# Patient Record
Sex: MALE
[De-identification: names, ages, dates, MRNs, and addresses within clinical notes are randomized per-mention and may not be internally consistent; named-entity substitution may affect disease eponyms.]

---

## 2021-06-09 ENCOUNTER — TRANSCRIPTION ENCOUNTER (OUTPATIENT)
Age: 41
End: 2021-06-09

## 2021-06-21 ENCOUNTER — APPOINTMENT (OUTPATIENT)
Dept: OPHTHALMOLOGY | Facility: CLINIC | Age: 41
End: 2021-06-21
Payer: COMMERCIAL

## 2021-06-21 ENCOUNTER — NON-APPOINTMENT (OUTPATIENT)
Age: 41
End: 2021-06-21

## 2021-06-21 PROCEDURE — 92004 COMPRE OPH EXAM NEW PT 1/>: CPT

## 2021-07-19 ENCOUNTER — NON-APPOINTMENT (OUTPATIENT)
Age: 41
End: 2021-07-19

## 2021-07-19 ENCOUNTER — APPOINTMENT (OUTPATIENT)
Dept: OPHTHALMOLOGY | Facility: CLINIC | Age: 41
End: 2021-07-19
Payer: COMMERCIAL

## 2021-07-19 PROCEDURE — 99213 OFFICE O/P EST LOW 20 MIN: CPT

## 2024-10-02 ENCOUNTER — NON-APPOINTMENT (OUTPATIENT)
Age: 44
End: 2024-10-02

## 2024-10-02 ENCOUNTER — APPOINTMENT (OUTPATIENT)
Dept: PLASTIC SURGERY | Facility: CLINIC | Age: 44
End: 2024-10-02
Payer: COMMERCIAL

## 2024-10-02 VITALS
HEART RATE: 58 BPM | BODY MASS INDEX: 24.08 KG/M2 | OXYGEN SATURATION: 97 % | SYSTOLIC BLOOD PRESSURE: 118 MMHG | HEIGHT: 71 IN | TEMPERATURE: 98.3 F | DIASTOLIC BLOOD PRESSURE: 76 MMHG | WEIGHT: 172 LBS

## 2024-10-02 DIAGNOSIS — Z78.9 OTHER SPECIFIED HEALTH STATUS: ICD-10-CM

## 2024-10-02 DIAGNOSIS — D17.1 BENIGN LIPOMATOUS NEOPLASM OF SKIN AND SUBCUTANEOUS TISSUE OF TRUNK: ICD-10-CM

## 2024-10-02 PROBLEM — Z00.00 ENCOUNTER FOR PREVENTIVE HEALTH EXAMINATION: Status: ACTIVE | Noted: 2024-10-02

## 2024-10-02 PROCEDURE — 99202 OFFICE O/P NEW SF 15 MIN: CPT

## 2024-10-03 PROBLEM — Z78.9 SOCIAL ALCOHOL USE: Status: ACTIVE | Noted: 2024-10-02

## 2024-10-03 PROBLEM — Z78.9 NON-SMOKER: Status: ACTIVE | Noted: 2024-10-02

## 2024-10-03 PROBLEM — D17.1 LIPOMA OF BACK: Status: ACTIVE | Noted: 2024-10-03

## 2024-10-03 NOTE — PHYSICAL EXAM
[NI] : Normal [de-identified] : NL respiratory effort noted  [de-identified] : Right upper back mass near the posterior axillary fold, approx 3 cm in diameter. It is soft and mobile in the SQ. It does seem tethered to the dermis, but there is no visible punctum.

## 2024-10-03 NOTE — REASON FOR VISIT
[Consultation] : a consultation visit [FreeTextEntry1] : Patient here for a lipoma consultation on his right shoulder.

## 2024-10-03 NOTE — ASSESSMENT
[FreeTextEntry1] : Right posterior shoulder/right upper back mass, consistent with lipoma. Recommend excision under local anesthesia in the office.   We discussed direct excision which, in my opinion, is the best option. The R/B/A were reviewed including, but not limited to, bleeding, bruising, infection, scarring and poor wound healing as well as recurrence.  He was given an opportunity to ask questions and all of his questions were answered. He wishes to proceed.

## 2024-10-03 NOTE — PHYSICAL EXAM
[NI] : Normal [de-identified] : NL respiratory effort noted  [de-identified] : Right upper back mass near the posterior axillary fold, approx 3 cm in diameter. It is soft and mobile in the SQ. It does seem tethered to the dermis, but there is no visible punctum.

## 2024-10-03 NOTE — HISTORY OF PRESENT ILLNESS
[FreeTextEntry1] : This is a 45 yo man who is referred by Dr. Manning for evaluation of a right shoulder mass, thought to be a lipoma. Pt reports that he didn't really know the mass was there until he and his wife participated in a long bike race and she noticed the contour of the mass through the thin bike shirt. He has an US at [facility] that described the SQ mass and reported it as a possible lipoma.

## 2024-10-03 NOTE — HISTORY OF PRESENT ILLNESS
[FreeTextEntry1] : This is a 45 yo man who is referred by Dr. Manning for evaluation of a right shoulder mass, thought to be a lipoma. Pt reports that he didn't really know the mass was there until he and his wife participated in a long bike race and she noticed the contour of the mass through the thin bike shirt. He has an US at [facility] that described the SQ mass and reported it as a possible lipoma.

## 2024-12-09 ENCOUNTER — APPOINTMENT (OUTPATIENT)
Dept: PLASTIC SURGERY | Facility: CLINIC | Age: 44
End: 2024-12-09
Payer: COMMERCIAL

## 2024-12-09 VITALS
TEMPERATURE: 97.9 F | OXYGEN SATURATION: 99 % | BODY MASS INDEX: 23.99 KG/M2 | HEART RATE: 59 BPM | SYSTOLIC BLOOD PRESSURE: 132 MMHG | WEIGHT: 172 LBS | DIASTOLIC BLOOD PRESSURE: 83 MMHG

## 2024-12-09 DIAGNOSIS — D17.1 BENIGN LIPOMATOUS NEOPLASM OF SKIN AND SUBCUTANEOUS TISSUE OF TRUNK: ICD-10-CM

## 2024-12-09 PROCEDURE — 24071 EXC ARM/ELBOW LES SC 3 CM/>: CPT | Mod: RT

## 2024-12-16 ENCOUNTER — APPOINTMENT (OUTPATIENT)
Dept: PLASTIC SURGERY | Facility: CLINIC | Age: 44
End: 2024-12-16
Payer: COMMERCIAL

## 2024-12-16 VITALS
SYSTOLIC BLOOD PRESSURE: 133 MMHG | DIASTOLIC BLOOD PRESSURE: 79 MMHG | OXYGEN SATURATION: 99 % | HEART RATE: 48 BPM | TEMPERATURE: 97.2 F | BODY MASS INDEX: 24.27 KG/M2 | WEIGHT: 174 LBS

## 2024-12-16 DIAGNOSIS — Z09 ENCOUNTER FOR FOLLOW-UP EXAMINATION AFTER COMPLETED TREATMENT FOR CONDITIONS OTHER THAN MALIGNANT NEOPLASM: ICD-10-CM

## 2024-12-16 LAB — CORE LAB BIOPSY: NORMAL

## 2024-12-16 PROCEDURE — 99024 POSTOP FOLLOW-UP VISIT: CPT

## 2025-03-17 ENCOUNTER — APPOINTMENT (OUTPATIENT)
Dept: PLASTIC SURGERY | Facility: CLINIC | Age: 45
End: 2025-03-17

## 2025-05-08 ENCOUNTER — APPOINTMENT (OUTPATIENT)
Dept: PLASTIC SURGERY | Facility: CLINIC | Age: 45
End: 2025-05-08
Payer: COMMERCIAL

## 2025-05-08 VITALS
HEART RATE: 66 BPM | BODY MASS INDEX: 23.82 KG/M2 | HEIGHT: 71 IN | DIASTOLIC BLOOD PRESSURE: 74 MMHG | OXYGEN SATURATION: 97 % | WEIGHT: 170.13 LBS | TEMPERATURE: 97.9 F | SYSTOLIC BLOOD PRESSURE: 120 MMHG

## 2025-05-08 DIAGNOSIS — L90.5 SCAR CONDITIONS AND FIBROSIS OF SKIN: ICD-10-CM

## 2025-05-08 DIAGNOSIS — L91.0 HYPERTROPHIC SCAR: ICD-10-CM

## 2025-05-08 DIAGNOSIS — Z86.018 PERSONAL HISTORY OF OTHER BENIGN NEOPLASM: ICD-10-CM

## 2025-05-08 PROCEDURE — 99213 OFFICE O/P EST LOW 20 MIN: CPT
